# Patient Record
Sex: FEMALE | URBAN - METROPOLITAN AREA
[De-identification: names, ages, dates, MRNs, and addresses within clinical notes are randomized per-mention and may not be internally consistent; named-entity substitution may affect disease eponyms.]

---

## 2023-08-16 ENCOUNTER — MEDICAL CORRESPONDENCE (OUTPATIENT)
Dept: HEALTH INFORMATION MANAGEMENT | Facility: CLINIC | Age: 63
End: 2023-08-16

## 2023-08-17 ENCOUNTER — TRANSCRIBE ORDERS (OUTPATIENT)
Dept: OTHER | Age: 63
End: 2023-08-17

## 2023-08-17 DIAGNOSIS — H35.9 RETINAL LESION: Primary | ICD-10-CM

## 2023-08-17 DIAGNOSIS — D31.32 CHOROIDAL NEVUS, LEFT: ICD-10-CM

## 2023-08-17 DIAGNOSIS — Z13.5 SCREENING FOR EYE CONDITION: ICD-10-CM

## 2023-08-17 DIAGNOSIS — H25.13 SENILE NUCLEAR SCLEROSIS, BILATERAL: ICD-10-CM

## 2023-08-28 ENCOUNTER — TELEPHONE (OUTPATIENT)
Dept: OPHTHALMOLOGY | Facility: CLINIC | Age: 63
End: 2023-08-28

## 2023-08-28 NOTE — TELEPHONE ENCOUNTER
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        Please reach out to patient for appointment. Thanks.       Action Taken: Other: eye    Travel Screening: Not Applicable

## 2023-09-27 NOTE — TELEPHONE ENCOUNTER
Spoke with pt today. She states she cannot afford to come to our clinic at this time. It will cost her at least $1,000 to get here.  I asked if she had relayed this info to her referring provider, and she had not. She states she will call and let her know.    Sabrina Botello, COA 3:47 PM September 27, 2023